# Patient Record
Sex: MALE | Race: WHITE | Employment: FULL TIME | URBAN - NONMETROPOLITAN AREA
[De-identification: names, ages, dates, MRNs, and addresses within clinical notes are randomized per-mention and may not be internally consistent; named-entity substitution may affect disease eponyms.]

---

## 2021-06-29 ENCOUNTER — HOSPITAL ENCOUNTER (OUTPATIENT)
Dept: NON INVASIVE DIAGNOSTICS | Age: 61
Discharge: HOME | End: 2021-06-29
Payer: COMMERCIAL

## 2021-06-29 ENCOUNTER — HOSPITAL ENCOUNTER (OUTPATIENT)
Dept: PULMONOLOGY | Age: 61
Discharge: HOME | End: 2021-06-29
Payer: COMMERCIAL

## 2021-06-29 DIAGNOSIS — R06.09 DYSPNEA ON EXERTION: ICD-10-CM

## 2021-06-29 DIAGNOSIS — R06.02 SHORTNESS OF BREATH ON EXERTION: ICD-10-CM

## 2021-06-29 DIAGNOSIS — I10 ESSENTIAL HYPERTENSION, MALIGNANT: ICD-10-CM

## 2021-06-29 LAB
LV EF: 55 %
LVEF MODALITY: NORMAL

## 2021-06-29 PROCEDURE — 94726 PLETHYSMOGRAPHY LUNG VOLUMES: CPT

## 2021-06-29 PROCEDURE — 94060 EVALUATION OF WHEEZING: CPT

## 2021-06-29 PROCEDURE — 93306 TTE W/DOPPLER COMPLETE: CPT

## 2021-06-29 PROCEDURE — 94729 DIFFUSING CAPACITY: CPT

## 2021-06-29 NOTE — PROGRESS NOTES
Prescreening performed prior to testing. The following symptoms may indicate COVID-19 infection:        One of the following criteria:   Temperature taken, patient temperature was 98 F. Fever greater 100.0 F -no  Cough -  no  New onset shortness of breath -no  New onset difficulty breathing -no        And/or   Two or more of the following criteria:  Muscle aches -no  Headache -no  Sore throat -no  New onset loss of smell/taste -no  New onset diarrhea -no    Patient's screening was negative. PFT will be performed.

## 2022-03-09 ENCOUNTER — HOSPITAL ENCOUNTER (OUTPATIENT)
Age: 62
Discharge: HOME | End: 2022-03-09
Payer: COMMERCIAL

## 2022-03-09 LAB
EKG ATRIAL RATE: 50 BPM
EKG P AXIS: 57 DEGREES
EKG P-R INTERVAL: 164 MS
EKG Q-T INTERVAL: 424 MS
EKG QRS DURATION: 94 MS
EKG QTC CALCULATION (BAZETT): 386 MS
EKG R AXIS: -10 DEGREES
EKG T AXIS: 38 DEGREES
EKG VENTRICULAR RATE: 50 BPM

## 2022-03-09 PROCEDURE — 93005 ELECTROCARDIOGRAM TRACING: CPT | Performed by: FAMILY MEDICINE

## 2022-03-09 PROCEDURE — 93010 ELECTROCARDIOGRAM REPORT: CPT | Performed by: NUCLEAR MEDICINE

## 2022-10-05 ENCOUNTER — HOSPITAL ENCOUNTER (OUTPATIENT)
Age: 62
Discharge: HOME | End: 2022-10-05
Payer: COMMERCIAL

## 2022-10-05 ENCOUNTER — HOSPITAL ENCOUNTER (OUTPATIENT)
Dept: GENERAL RADIOLOGY | Age: 62
Discharge: HOME | End: 2022-10-05
Payer: COMMERCIAL

## 2022-10-05 DIAGNOSIS — Z01.810 PRE-OPERATIVE CARDIOVASCULAR EXAMINATION: ICD-10-CM

## 2022-10-05 LAB
ANION GAP SERPL CALCULATED.3IONS-SCNC: 10 MEQ/L (ref 8–16)
APTT: 32.7 SECONDS (ref 22–38)
BASOPHILS # BLD: 0.8 %
BASOPHILS ABSOLUTE: 0.1 THOU/MM3 (ref 0–0.1)
BILIRUBIN URINE: NEGATIVE
BLOOD, URINE: NEGATIVE
BUN BLDV-MCNC: 14 MG/DL (ref 7–22)
CALCIUM SERPL-MCNC: 9.3 MG/DL (ref 8.5–10.5)
CHARACTER, URINE: CLEAR
CHLORIDE BLD-SCNC: 103 MEQ/L (ref 98–111)
CO2: 27 MEQ/L (ref 23–33)
COLOR: YELLOW
CREAT SERPL-MCNC: 0.7 MG/DL (ref 0.4–1.2)
EOSINOPHIL # BLD: 1.4 %
EOSINOPHILS ABSOLUTE: 0.1 THOU/MM3 (ref 0–0.4)
ERYTHROCYTE [DISTWIDTH] IN BLOOD BY AUTOMATED COUNT: 15.6 % (ref 11.5–14.5)
ERYTHROCYTE [DISTWIDTH] IN BLOOD BY AUTOMATED COUNT: 48.5 FL (ref 35–45)
GFR SERPL CREATININE-BSD FRML MDRD: > 90 ML/MIN/1.73M2
GLUCOSE FASTING: 116 MG/DL (ref 70–108)
GLUCOSE, URINE: >= 1000 MG/DL
HCT VFR BLD CALC: 52.1 % (ref 42–52)
HEMOGLOBIN: 16.9 GM/DL (ref 14–18)
IMMATURE GRANS (ABS): 0.04 THOU/MM3 (ref 0–0.07)
IMMATURE GRANULOCYTES: 0.4 %
INR BLD: 0.94 (ref 0.85–1.13)
KETONES, URINE: NEGATIVE
LEUKOCYTE EST, POC: NEGATIVE
LYMPHOCYTES # BLD: 26 %
LYMPHOCYTES ABSOLUTE: 2.6 THOU/MM3 (ref 1–4.8)
MCH RBC QN AUTO: 28.3 PG (ref 26–33)
MCHC RBC AUTO-ENTMCNC: 32.4 GM/DL (ref 32.2–35.5)
MCV RBC AUTO: 87.1 FL (ref 80–94)
MONOCYTES # BLD: 8.2 %
MONOCYTES ABSOLUTE: 0.8 THOU/MM3 (ref 0.4–1.3)
NITRITE, URINE: NEGATIVE
NUCLEATED RED BLOOD CELLS: 0 /100 WBC
PH UA: 5.5 (ref 5–9)
PLATELET # BLD: 218 THOU/MM3 (ref 130–400)
PMV BLD AUTO: 11.5 FL (ref 9.4–12.4)
POTASSIUM SERPL-SCNC: 4.5 MEQ/L (ref 3.5–5.2)
PROTEIN UA: NEGATIVE MG/DL
RBC # BLD: 5.98 MILL/MM3 (ref 4.7–6.1)
SEG NEUTROPHILS: 63.2 %
SEGMENTED NEUTROPHILS ABSOLUTE COUNT: 6.3 THOU/MM3 (ref 1.8–7.7)
SODIUM BLD-SCNC: 140 MEQ/L (ref 135–145)
SPECIFIC GRAVITY UA: 1.03 (ref 1–1.03)
UROBILINOGEN, URINE: 0.2 EU/DL (ref 0–1)
WBC # BLD: 10 THOU/MM3 (ref 4.8–10.8)

## 2022-10-05 PROCEDURE — 81003 URINALYSIS AUTO W/O SCOPE: CPT

## 2022-10-05 PROCEDURE — 71046 X-RAY EXAM CHEST 2 VIEWS: CPT

## 2022-10-05 PROCEDURE — 93010 ELECTROCARDIOGRAM REPORT: CPT | Performed by: INTERNAL MEDICINE

## 2022-10-05 PROCEDURE — 36415 COLL VENOUS BLD VENIPUNCTURE: CPT

## 2022-10-05 PROCEDURE — 85610 PROTHROMBIN TIME: CPT

## 2022-10-05 PROCEDURE — 80048 BASIC METABOLIC PNL TOTAL CA: CPT

## 2022-10-05 PROCEDURE — 85025 COMPLETE CBC W/AUTO DIFF WBC: CPT

## 2022-10-05 PROCEDURE — 93005 ELECTROCARDIOGRAM TRACING: CPT | Performed by: FAMILY MEDICINE

## 2022-10-05 PROCEDURE — 85730 THROMBOPLASTIN TIME PARTIAL: CPT

## 2022-10-07 LAB
EKG ATRIAL RATE: 50 BPM
EKG P AXIS: 81 DEGREES
EKG P-R INTERVAL: 178 MS
EKG Q-T INTERVAL: 430 MS
EKG QRS DURATION: 92 MS
EKG QTC CALCULATION (BAZETT): 392 MS
EKG R AXIS: -15 DEGREES
EKG T AXIS: 54 DEGREES
EKG VENTRICULAR RATE: 50 BPM

## 2023-06-06 ENCOUNTER — TELEPHONE (OUTPATIENT)
Dept: NEUROLOGY | Age: 63
End: 2023-06-06

## 2023-06-06 NOTE — TELEPHONE ENCOUNTER
----- Message from DONNIE Mcnamara CNP sent at 6/5/2023 11:06 AM EDT -----  Please let patient know his vitamin B12 level is marginally low=316. He needs to start on vitamin B12 sublingual supplements at least 3000 mcg daily, over the counter.   Please have him call the office in 2-3 months to obtain repeat vitamin B12 level  Julio Zeng CNP

## 2023-06-07 ENCOUNTER — TELEPHONE (OUTPATIENT)
Dept: NEUROLOGY | Age: 63
End: 2023-06-07

## 2023-06-07 NOTE — TELEPHONE ENCOUNTER
Received notice from [facility] lab stating B6 was not able to be run due to specimen not protected from light. Spoke with patient who stated he uses [facility] lab and requested order be sent via [employer]. Also mailed to address on profile.

## 2023-06-29 ENCOUNTER — OFFICE VISIT (OUTPATIENT)
Dept: NEUROLOGY | Age: 63
End: 2023-06-29
Payer: COMMERCIAL

## 2023-06-29 VITALS
HEART RATE: 47 BPM | WEIGHT: 291 LBS | SYSTOLIC BLOOD PRESSURE: 140 MMHG | OXYGEN SATURATION: 97 % | DIASTOLIC BLOOD PRESSURE: 70 MMHG | BODY MASS INDEX: 40.59 KG/M2

## 2023-06-29 DIAGNOSIS — G25.0 ESSENTIAL TREMOR: Primary | ICD-10-CM

## 2023-06-29 DIAGNOSIS — G25.0 BENIGN HEAD TREMOR: ICD-10-CM

## 2023-06-29 PROCEDURE — 99213 OFFICE O/P EST LOW 20 MIN: CPT | Performed by: NURSE PRACTITIONER

## 2023-06-29 RX ORDER — PRIMIDONE 50 MG/1
50 TABLET ORAL 2 TIMES DAILY
Qty: 60 TABLET | Refills: 3 | Status: SHIPPED | OUTPATIENT
Start: 2023-06-29

## 2023-11-28 DIAGNOSIS — G25.0 BENIGN HEAD TREMOR: ICD-10-CM

## 2023-11-28 DIAGNOSIS — G25.0 ESSENTIAL TREMOR: Primary | ICD-10-CM

## 2023-11-28 RX ORDER — PRIMIDONE 50 MG/1
50 TABLET ORAL 2 TIMES DAILY
Qty: 60 TABLET | Refills: 3 | Status: SHIPPED | OUTPATIENT
Start: 2023-11-28

## 2023-12-11 DIAGNOSIS — G25.0 BENIGN HEAD TREMOR: ICD-10-CM

## 2023-12-11 DIAGNOSIS — G25.0 ESSENTIAL TREMOR: ICD-10-CM

## 2023-12-11 RX ORDER — PRIMIDONE 50 MG/1
50 TABLET ORAL 2 TIMES DAILY
Qty: 60 TABLET | Refills: 3 | OUTPATIENT
Start: 2023-12-11

## 2024-03-20 DIAGNOSIS — G25.0 ESSENTIAL TREMOR: ICD-10-CM

## 2024-03-20 DIAGNOSIS — G25.0 BENIGN HEAD TREMOR: ICD-10-CM

## 2024-03-21 RX ORDER — PRIMIDONE 50 MG/1
50 TABLET ORAL 2 TIMES DAILY
Qty: 60 TABLET | Refills: 7 | OUTPATIENT
Start: 2024-03-21

## 2024-07-19 ENCOUNTER — OFFICE VISIT (OUTPATIENT)
Dept: NEUROLOGY | Age: 64
End: 2024-07-19
Payer: COMMERCIAL

## 2024-07-19 VITALS
OXYGEN SATURATION: 94 % | WEIGHT: 287.6 LBS | DIASTOLIC BLOOD PRESSURE: 68 MMHG | HEIGHT: 71 IN | HEART RATE: 55 BPM | BODY MASS INDEX: 40.26 KG/M2 | SYSTOLIC BLOOD PRESSURE: 140 MMHG

## 2024-07-19 DIAGNOSIS — G25.0 BENIGN HEAD TREMOR: ICD-10-CM

## 2024-07-19 DIAGNOSIS — G25.0 ESSENTIAL TREMOR: Primary | ICD-10-CM

## 2024-07-19 PROCEDURE — 99213 OFFICE O/P EST LOW 20 MIN: CPT | Performed by: NURSE PRACTITIONER

## 2024-07-19 PROCEDURE — 1123F ACP DISCUSS/DSCN MKR DOCD: CPT | Performed by: NURSE PRACTITIONER

## 2024-07-19 NOTE — PROGRESS NOTES
NEUROLOGY OUT PATIENT FOLLOW UP NOTE:  7/19/20249:36 AM    Anupam Thorne is here for follow up for essential tremor, benign head tremor.             Allergies   Allergen Reactions    Cephalosporins Rash and Other (See Comments)     JOINTS SWELL       Current Outpatient Medications:     b complex vitamins capsule, Take 1 capsule by mouth daily, Disp: , Rfl:     primidone (MYSOLINE) 50 MG tablet, Take 1 tablet by mouth in the morning and at bedtime, Disp: 60 tablet, Rfl: 7    metoprolol succinate (TOPROL XL) 50 MG extended release tablet, Take 1 tablet by mouth daily, Disp: , Rfl:     Empagliflozin-linaGLIPtin (GLYXAMBI) 25-5 MG TABS, Take by mouth Once daily, Disp: , Rfl:     atorvastatin (LIPITOR) 10 MG tablet, Take 1 tablet by mouth daily, Disp: , Rfl:     metFORMIN (GLUCOPHAGE) 1000 MG tablet, Take 1 tablet by mouth 2 times daily (with meals), Disp: , Rfl:     amLODIPine (NORVASC) 10 MG tablet, Take 1 tablet by mouth daily, Disp: , Rfl:     losartan (COZAAR) 100 MG tablet, Take 1 tablet by mouth daily, Disp: , Rfl:     Cholecalciferol (VITAMIN D3 PO), Take by mouth, Disp: , Rfl:     aspirin 81 MG EC tablet, Take 1 tablet by mouth daily, Disp: , Rfl:     Ascorbic Acid (VITAMIN C PO), Take by mouth, Disp: , Rfl:     omeprazole (PRILOSEC) 20 MG capsule, Take 1 capsule by mouth every other day, Disp: , Rfl:     Multiple Vitamins-Minerals (MULTIVITAMIN PO), Take 1 tablet by mouth daily., Disp: , Rfl:     VALERIAN ROOT PO, Take by mouth, Disp: , Rfl:     ZINC PO, Take by mouth, Disp: , Rfl:     ferrous sulfate (IRON 325) 325 (65 Fe) MG tablet, Take 1 tablet by mouth daily (with breakfast) (Patient not taking: Reported on 7/19/2024), Disp: , Rfl:     I reviewed the past medical history, allergies, medications, social history and family history.       PE:   Vitals:    07/19/24 0931   BP: (!) 140/68   Site: Left Upper Arm   Position: Sitting   Cuff Size: Large Adult   Pulse: 55   SpO2: 94%   Weight: 130.5 kg (287 lb 9.6

## 2024-07-19 NOTE — PATIENT INSTRUCTIONS
Continue Primidone 50 mg two times a day. Refills given  Continue with Vitamin B12 sublingual supplements at least 3000 mcg daily, over the counter.  Follow up in 7 months or sooner if needed.  Call if any questions or concerns.

## 2024-12-26 ENCOUNTER — APPOINTMENT (OUTPATIENT)
Dept: GENERAL RADIOLOGY | Age: 64
End: 2024-12-26
Payer: COMMERCIAL

## 2024-12-26 ENCOUNTER — HOSPITAL ENCOUNTER (EMERGENCY)
Age: 64
Discharge: HOME | End: 2024-12-26
Payer: COMMERCIAL

## 2024-12-26 VITALS
RESPIRATION RATE: 20 BRPM | TEMPERATURE: 98.1 F | SYSTOLIC BLOOD PRESSURE: 127 MMHG | OXYGEN SATURATION: 96 % | BODY MASS INDEX: 39.48 KG/M2 | DIASTOLIC BLOOD PRESSURE: 81 MMHG | HEART RATE: 82 BPM | WEIGHT: 282 LBS | HEIGHT: 71 IN
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
## 2024-12-26 DIAGNOSIS — S52.101A CLOSED FRACTURE OF PROXIMAL END OF RIGHT RADIUS, UNSPECIFIED FRACTURE MORPHOLOGY, INITIAL ENCOUNTER: ICD-10-CM

## 2024-12-26 DIAGNOSIS — M25.511 ACUTE PAIN OF RIGHT SHOULDER: ICD-10-CM

## 2024-12-26 PROCEDURE — 99203 OFFICE O/P NEW LOW 30 MIN: CPT | Performed by: NURSE PRACTITIONER

## 2024-12-26 PROCEDURE — 73080 X-RAY EXAM OF ELBOW: CPT

## 2024-12-26 PROCEDURE — 99213 OFFICE O/P EST LOW 20 MIN: CPT

## 2024-12-26 PROCEDURE — 73030 X-RAY EXAM OF SHOULDER: CPT

## 2024-12-26 ASSESSMENT — ENCOUNTER SYMPTOMS
NAUSEA: 0
SHORTNESS OF BREATH: 0
CHEST TIGHTNESS: 0
COLOR CHANGE: 1
SORE THROAT: 0
RHINORRHEA: 0
COUGH: 0
DIARRHEA: 0
VOMITING: 0

## 2024-12-26 ASSESSMENT — PAIN - FUNCTIONAL ASSESSMENT
PAIN_FUNCTIONAL_ASSESSMENT: 0-10
PAIN_FUNCTIONAL_ASSESSMENT: PREVENTS OR INTERFERES WITH MANY ACTIVE NOT PASSIVE ACTIVITIES

## 2024-12-26 ASSESSMENT — PAIN DESCRIPTION - LOCATION: LOCATION: ELBOW;SHOULDER

## 2024-12-26 ASSESSMENT — PAIN DESCRIPTION - ORIENTATION: ORIENTATION: RIGHT

## 2024-12-26 ASSESSMENT — PAIN DESCRIPTION - PAIN TYPE: TYPE: ACUTE PAIN

## 2024-12-26 ASSESSMENT — PAIN SCALES - GENERAL: PAINLEVEL_OUTOF10: 8

## 2024-12-26 ASSESSMENT — PAIN DESCRIPTION - ONSET: ONSET: SUDDEN

## 2024-12-26 ASSESSMENT — PAIN DESCRIPTION - FREQUENCY: FREQUENCY: CONTINUOUS

## 2024-12-26 ASSESSMENT — PAIN DESCRIPTION - DESCRIPTORS: DESCRIPTORS: ACHING

## 2024-12-26 NOTE — ED NOTES
PT GIVEN DISCHARGE INSTRUCTIONS, VERBALIZES UNDERSTANDING.  PT ASSESSMENT UNCHANGED, DISCHARGED IN STABLE CONDITION.        Yhair Camarillo RN  12/26/24 0693

## 2024-12-26 NOTE — ED PROVIDER NOTES
OFELIA Roberson, (age 12y+), IM, 50 mcg/0.5 mL 09/30/2022    TDaP, ADACEL (age 10y-64y), BOOSTRIX (age 10y+), IM, 0.5mL 09/02/2014       FAMILY HISTORY     Patient's family history includes Breast Cancer in his sister; Cancer in his brother; Heart Attack in his father; Heart Disease in his father and sister; Lung Cancer in his father.    SOCIAL HISTORY     Patient  reports that he quit smoking about 32 years ago. His smoking use included cigarettes. He started smoking about 47 years ago. He has never used smokeless tobacco. He reports current alcohol use of about 5.0 standard drinks of alcohol per week. He reports that he does not use drugs.    PHYSICAL EXAM     ED TRIAGE VITALS  BP: 127/81, Temp: 98.1 °F (36.7 °C), Pulse: 82, Respirations: 20, SpO2: 96 %,Estimated body mass index is 39.33 kg/m² as calculated from the following:    Height as of this encounter: 1.803 m (5' 11\").    Weight as of this encounter: 127.9 kg (282 lb).,No LMP for male patient.    Physical Exam  Vitals and nursing note reviewed.   Constitutional:       General: He is not in acute distress.     Appearance: Normal appearance. He is not ill-appearing, toxic-appearing or diaphoretic.   HENT:      Head: Normocephalic.      Right Ear: Ear canal and external ear normal.      Left Ear: Ear canal and external ear normal.      Nose: Nose normal. No congestion or rhinorrhea.      Mouth/Throat:      Mouth: Mucous membranes are moist.      Pharynx: Oropharynx is clear. No oropharyngeal exudate or posterior oropharyngeal erythema.   Cardiovascular:      Rate and Rhythm: Normal rate.      Pulses: Normal pulses.   Pulmonary:      Effort: Pulmonary effort is normal. No respiratory distress.      Breath sounds: No stridor. No wheezing or rhonchi.   Abdominal:      General: Abdomen is flat. Bowel sounds are normal.      Palpations: Abdomen is soft.   Musculoskeletal:         General: No swelling or tenderness. Normal range of motion.        Arms:        Cervical back: Normal range of motion.   Skin:         Neurological:      General: No focal deficit present.      Mental Status: He is alert and oriented to person, place, and time.   Psychiatric:         Mood and Affect: Mood normal.         Behavior: Behavior normal.         DIAGNOSTIC RESULTS     Labs:No results found for this visit on 12/26/24.    IMAGING:    XR ELBOW RIGHT (MIN 3 VIEWS)   Final Result   1. Relative extensive soft tissue swelling. Hypertrophic spurring, as described.   2. Very subtle hairline fracture of the proximal radial shaft, seen on only one   view            **This report has been created using voice recognition software.  It may contain   minor errors which are inherent in voice recognition technology.**      Electronically signed by Dr. Henrry Alfonso      XR SHOULDER RIGHT (MIN 2 VIEWS)   Final Result      No fracture or dislocation.            **This report has been created using voice recognition software. It may contain   minor errors which are inherent in voice recognition technology.**         Electronically signed by Dr. Savage Gibson            EKG: None      URGENT CARE COURSE:     Vitals:    12/26/24 1411   BP: 127/81   Pulse: 82   Resp: 20   Temp: 98.1 °F (36.7 °C)   TempSrc: Temporal   SpO2: 96%   Weight: 127.9 kg (282 lb)   Height: 1.803 m (5' 11\")       Medications - No data to display         PROCEDURES:  None    FINAL IMPRESSION      1. Fall, initial encounter    2. Closed fracture of proximal end of right radius, unspecified fracture morphology, initial encounter    3. Acute pain of right shoulder          DISPOSITION/ PLAN     Patient seen and evaluated for a fall with elbow and shoulder pain.  An x-ray of the elbow and shoulder were completed revealing a subtle fracture of the proximal radial shaft.  Patient is placed in a sugar-tong splint with a sling.  Instructed to use over-the-counter Tylenol and Motrin for pain or fever.  Instructed use ice intermittently.

## 2025-02-11 ENCOUNTER — OFFICE VISIT (OUTPATIENT)
Dept: NEUROLOGY | Age: 65
End: 2025-02-11
Payer: COMMERCIAL

## 2025-02-11 VITALS
BODY MASS INDEX: 39.14 KG/M2 | HEART RATE: 58 BPM | OXYGEN SATURATION: 94 % | SYSTOLIC BLOOD PRESSURE: 136 MMHG | HEIGHT: 71 IN | DIASTOLIC BLOOD PRESSURE: 68 MMHG | WEIGHT: 279.6 LBS

## 2025-02-11 DIAGNOSIS — R25.9 MIXED ACTION AND RESTING TREMOR: Primary | ICD-10-CM

## 2025-02-11 DIAGNOSIS — G25.0 BENIGN HEAD TREMOR: ICD-10-CM

## 2025-02-11 PROCEDURE — 99214 OFFICE O/P EST MOD 30 MIN: CPT | Performed by: NURSE PRACTITIONER

## 2025-02-11 PROCEDURE — 1123F ACP DISCUSS/DSCN MKR DOCD: CPT | Performed by: NURSE PRACTITIONER

## 2025-02-11 RX ORDER — TOPIRAMATE 50 MG/1
TABLET, FILM COATED ORAL
Qty: 30 TABLET | Refills: 3 | Status: SHIPPED | OUTPATIENT
Start: 2025-02-11

## 2025-02-11 NOTE — PATIENT INSTRUCTIONS
Stop Primidone due to drug to drug interaction.    Start Topamax 25 mg tablet daily for one week then 50 mg daily there after. Take with plenty of fluids.   Please call us in 2 weeks with an update as to how you are doing.   Continue with Vitamin B12 sublingual supplements at least 3000 mcg daily, over the counter.   Follow up in 4 months or sooner if needed.   Call if any questions or concerns.

## 2025-02-11 NOTE — PROGRESS NOTES
NEUROLOGY OUT PATIENT FOLLOW UP NOTE:  2/11/20258:26 AM    Anupam hTorne is here for follow up for tremor.        Allergies   Allergen Reactions    Penicillins     Cephalosporins Rash and Other (See Comments)     JOINTS SWELL       Current Outpatient Medications:     apixaban (ELIQUIS) 5 MG TABS tablet, Take 1 tablet by mouth 2 times daily, Disp: , Rfl:     primidone (MYSOLINE) 50 MG tablet, Take 1 tablet by mouth 3 times daily, Disp: 90 tablet, Rfl: 3    b complex vitamins capsule, Take 1 capsule by mouth daily, Disp: , Rfl:     metoprolol succinate (TOPROL XL) 50 MG extended release tablet, Take 1 tablet by mouth daily, Disp: , Rfl:     Empagliflozin-linaGLIPtin (GLYXAMBI) 25-5 MG TABS, Take by mouth Once daily, Disp: , Rfl:     atorvastatin (LIPITOR) 10 MG tablet, Take 1 tablet by mouth daily, Disp: , Rfl:     metFORMIN (GLUCOPHAGE) 1000 MG tablet, Take 1 tablet by mouth 2 times daily (with meals), Disp: , Rfl:     amLODIPine (NORVASC) 10 MG tablet, Take 1 tablet by mouth daily, Disp: , Rfl:     losartan (COZAAR) 100 MG tablet, Take 1 tablet by mouth daily, Disp: , Rfl:     Cholecalciferol (VITAMIN D3 PO), Take by mouth, Disp: , Rfl:     aspirin 81 MG EC tablet, Take 1 tablet by mouth daily, Disp: , Rfl:     ferrous sulfate (IRON 325) 325 (65 Fe) MG tablet, Take 1 tablet by mouth daily (with breakfast), Disp: , Rfl:     Ascorbic Acid (VITAMIN C PO), Take by mouth, Disp: , Rfl:     omeprazole (PRILOSEC) 20 MG capsule, Take 1 capsule by mouth every other day, Disp: , Rfl:     Multiple Vitamins-Minerals (MULTIVITAMIN PO), Take 1 tablet by mouth daily., Disp: , Rfl:     I reviewed the past medical history, allergies, medications, social history and family history.       PE:   Vitals:    02/11/25 0823   BP: 136/68   Site: Left Upper Arm   Position: Sitting   Pulse: 58   SpO2: 94%   Weight: 126.8 kg (279 lb 9.6 oz)   Height: 1.803 m (5' 11\")     General Appearance:  alert and cooperative  Gen: NAD, Language is Intact.

## 2025-06-03 RX ORDER — TOPIRAMATE 50 MG/1
TABLET, FILM COATED ORAL
Qty: 30 TABLET | Refills: 3 | Status: SHIPPED | OUTPATIENT
Start: 2025-06-03

## 2025-06-03 NOTE — TELEPHONE ENCOUNTER
We have received a refill request on the following medications:  Requested Prescriptions     Pending Prescriptions Disp Refills    topiramate (TOPAMAX) 50 MG tablet 30 tablet 3     Sig: Topamax 25 mg tablet daily for one week then 50 mg daily there after. Take with plenty of fluids.       Date last time medication prescribed: 2/11/25    Last Visit Date:  2/11/2025 with Paige Leopold, CNP    Next Visit Date:    7/14/2025 with Paige Leopold, CNP      Please approve or deny.

## 2025-06-13 RX ORDER — TOPIRAMATE 50 MG/1
TABLET, FILM COATED ORAL
Qty: 30 TABLET | Refills: 3 | OUTPATIENT
Start: 2025-06-13

## 2025-07-14 ENCOUNTER — OFFICE VISIT (OUTPATIENT)
Dept: NEUROLOGY | Age: 65
End: 2025-07-14
Payer: COMMERCIAL

## 2025-07-14 VITALS
OXYGEN SATURATION: 95 % | BODY MASS INDEX: 38.85 KG/M2 | HEART RATE: 50 BPM | WEIGHT: 277.5 LBS | DIASTOLIC BLOOD PRESSURE: 56 MMHG | SYSTOLIC BLOOD PRESSURE: 120 MMHG | HEIGHT: 71 IN

## 2025-07-14 DIAGNOSIS — G25.0 BENIGN HEAD TREMOR: ICD-10-CM

## 2025-07-14 DIAGNOSIS — R25.9 MIXED ACTION AND RESTING TREMOR: Primary | ICD-10-CM

## 2025-07-14 PROCEDURE — 1123F ACP DISCUSS/DSCN MKR DOCD: CPT | Performed by: NURSE PRACTITIONER

## 2025-07-14 PROCEDURE — 99213 OFFICE O/P EST LOW 20 MIN: CPT | Performed by: NURSE PRACTITIONER

## 2025-07-14 RX ORDER — TOPIRAMATE 50 MG/1
TABLET, FILM COATED ORAL
Qty: 90 TABLET | Refills: 2 | Status: SHIPPED | OUTPATIENT
Start: 2025-07-14

## 2025-07-14 NOTE — PROGRESS NOTES
1.803 m (5' 11\")        General Appearance:  alert and cooperative  Gen: NAD, Language is Intact. Skin: no rash, lesion, dry   to touch. warm  Head: no rash, no icterus  Neck: There is no carotid bruits. The Neck is supple.    Neuro: CN 2-12 grossly intact with no focal deficits. Power 5/5 Throughout symmetric, Reflexes are   symmetric. Long tracts are intact. Cerebellar exam is Intact. Sensory exam is intact to light touch.  Gait is guarded. There is right hand resting tremor, bilateral action tremor in hands, head tremor, voice tremor. Reduced arm swing on right.   Musculoskeletal:  Has no hand arthritis, no limitation of ROM in any of the four extremities   Lower extremities no  edema        DATA:       Results for orders placed or performed in visit on 04/11/25   CBC   Result Value Ref Range    WBC 9.5 4.4 - 10.5 th/cmm    RBC 6.19 (H) 4.50 - 6.00 mil/cmm    Hemoglobin 17.3 (H) 13.5 - 16.5 gm/dL    Hematocrit 51.2 (H) 40.0 - 49.0 %    MCV 82.8 80 - 97 CU SHARMIN    MCH 28.0 27.5 - 33.0 PG    MCHC 33.8 33.0 - 36.0 gm/dL    RDW 16.0 12.0 - 16.0 %    Platelets 261 150 - 400 th/cmm    Neutrophils % 62.6 40 - 70 %    Lymphocytes % 21.1 15 - 45 %    Monocytes % 15.5 (H) 2 - 10 %    Eosinophils % 0.4 0 - 6 %    Basophils % 0.4 0 - 2 %    Nucleated RBCs 0.3 <1 /100 WBC    Neutrophils Absolute 5,900 1,800 - 7,700 /cmm    Lymphocytes Absolute 2,000 1,000 - 4,800 /cmm    Monocytes Absolute 1,500 (H) 0 - 800 /cmm    Eosinophils Absolute 0 0 - 500 /cmm    Basophils Absolute 0 0 - 200 /cmm   Comprehensive Metabolic Panel   Result Value Ref Range    Sodium 137 135 - 145 mEq/L    Potassium 3.6 3.6 - 5.0 mEq/L    Chloride 101 101 - 111 mEq/L    CO2 24 21 - 32 mEq/L    Anion Gap 12 4 - 12    Glucose 109 70 - 110 mg/dL    BUN 19 7 - 20 mg/dL    Creatinine 1.05 0.60 - 1.30 mg/dL    Creatinine Clearance >60     AST 17 15 - 41 IU/L    Alkaline Phosphatase 61 41 - 137 IU/L    Total Bilirubin 0.7 0.2 - 1.0 mg/dL    Calcium 8.00 (L) 8.8 -

## 2025-07-14 NOTE — PATIENT INSTRUCTIONS
Continue with Topamax  50 mg daily. Take with plenty of fluids.   Continue with Vitamin B12 sublingual supplements at least 3000 mcg daily, over the counter.   Follow through with sleep medicine recommendations.   Follow up in 9 months or sooner if needed.   Call if any questions or concerns.